# Patient Record
(demographics unavailable — no encounter records)

---

## 2025-02-25 NOTE — ASSESSMENT
[FreeTextEntry1] : Labs: None  CXR: pending  PFTs: pending  A/P: 69 yo F h/o HLD is evaluated for chronic cough.   Ms. Wallace's cough is likely multifactorial. Given her specific triggers and subjective wheezing/chest tightness, suspect some form of cough variant asthma. There may also be a component of GERD contributing as well - interestingly, she has had benefit with galen which is a prokinetic and may help explain this. UACS is unlikely contributing given her lack of upper respiratory symptoms (congestion, PND) otherwise.   Discussed trial of PPI as well as PRN albuterol, with plans to do full PFTs with Ni[facility] at next visit. We will also order a CXR to rule out any gross parenchymal abnormalities. If her cough persists despite these interventions, discussed CT chest for further evaluation.   1. Chronic Cough  2. R/O Asthma  3. GERD   -trial Albuterol PRN and Omeprazole 20 mg PO daily -CXR -PFTs with bronchodilator and FENO, 6MWT -follow-up with me in 2-3 weeks

## 2025-02-25 NOTE — ASSESSMENT
[FreeTextEntry1] : Labs: None  CXR: pending  PFTs: pending  A/P: 67 yo F h/o HLD is evaluated for chronic cough.   Ms. Wallace's cough is likely multifactorial. Given her specific triggers and subjective wheezing/chest tightness, suspect some form of cough variant asthma. There may also be a component of GERD contributing as well - interestingly, she has had benefit with galen which is a prokinetic and may help explain this. UACS is unlikely contributing given her lack of upper respiratory symptoms (congestion, PND) otherwise.   Discussed trial of PPI as well as PRN albuterol, with plans to do full PFTs with NiOX at next visit. We will also order a CXR to rule out any gross parenchymal abnormalities. If her cough persists despite these interventions, discussed CT chest for further evaluation.   1. Chronic Cough  2. R/O Asthma  3. GERD   -trial Albuterol PRN and Omeprazole 20 mg PO daily -CXR -PFTs with bronchodilator and FENO, 6MWT -follow-up with me in 2-3 weeks

## 2025-02-25 NOTE — REVIEW OF SYSTEMS
[Cough] : cough [Chest Tightness] : chest tightness [Seasonal Allergies] : seasonal allergies [GERD] : gerd [Negative] : Psychiatric [Fever] : no fever [Fatigue] : no fatigue [Chills] : no chills [Poor Appetite] : no poor appetite [Nasal Congestion] : no nasal congestion [Postnasal Drip] : no postnasal drip [Sinus Problems] : no sinus problems [Frequent URIs] : no frequent URIs [Sputum] : no sputum [Dyspnea] : no dyspnea [Wheezing] : no wheezing [A.M. Dry Mouth] : no a.m. dry mouth [SOB on Exertion] : no sob on exertion [Edema] : no edema [Syncope] : no syncope [Nasal Discharge] : no nasal discharge [Rash] : no rash [Itch] : no itch [Headache] : no headache [Dizziness] : no dizziness

## 2025-02-25 NOTE — ADDENDUM
[FreeTextEntry1] : Addendum (Anand; 2/25/25): CXR (2/25/25): IMPRESSION: Lungs clear. No infiltrate pleural effusion or pneumothorax. Unremarkable cardiac silhouette. No acute bone abnormality.

## 2025-02-25 NOTE — HISTORY OF PRESENT ILLNESS
[Never] : never [TextBox_4] : I saw Ms Mireya Wallace in consultation today for chronic cough. In review, Ms. Wallace is a 69 yo F h/o HLD. Ms. Wallace is cared for by Dr. Juana Castellanos.  Ms. Wallace's cough is chronic; she estimates that the cough started in . No change in symptoms, has been constantly bothersome since that time. Associated triggers include cold air, smoke, and laughter. She is a never smoker - her father smoked heavily growing up and has second hand exposure from him.  No personal or family history of any lung disease (asthma, COPD). Father  from lung cancer, mother had a lung tumor but  from COVID. Her lung abnormality was not worked up. Cough is associated rarely with clear phlegm. No associated fevers, chills, weight loss or B type symptoms.   Her only medication is a statin - not on ACE-is or ARBs. No pets at home. No allergies she is aware of other than seasonal. No history of recurrent infections or URIs. Does not have upper respiratory symptoms generally. Only alleviating factor is galen. There are no prandial triggers for her cough and it does not disturb her overnight. Has been told by family members that they think she is wheezing from time to time - she has not noticed this.   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  Ms. Wallace is a never smoker. She denies vaping.

## 2025-03-25 NOTE — ASSESSMENT
[FreeTextEntry1] : Labs:  None  CXR (2/25/25): IMPRESSION: Lungs clear. No infiltrate pleural effusion or pneumothorax. Unremarkable cardiac silhouette. No acute bone abnormality.  PFTs:           3/2025 FEV1/FVC   74% FEV1           1.99, 96% FVC             2.68, 102% TLC             3.73, 81% RV               1.33, 76% DLCO           102% -3/2025: FENO 24 ppb  6MWT 3/2025: 494 meters (1621 feet); lupe SpO2 95% on room air  A/P: 67 yo F h/o HLD is evaluated for chronic cough.   Ms. Wallace's improvement with PPI supports GERD as the etiology of her cough. Her PFTs are normal. We discussed GERD and treatment strategies, including PPI x3 months and conservative management: avoiding precipitating foods, eating >3 hours before sleep, elevating her head of bed 30 degrees. If she has recurrence at 3 months, we discussed transitioning to an H2 blocker (e.g., Famotidine).  1. Chronic cough 2. GERD  -continue PPI x3 months -discussed lifestyle measures -follow-up with our clinic PRN

## 2025-03-25 NOTE — PHYSICAL EXAM
[No Acute Distress] : no acute distress [Normal Appearance] : normal appearance [No Neck Mass] : no neck mass [Normal Rate/Rhythm] : normal rate/rhythm [Normal S1, S2] : normal s1, s2 [No Murmurs] : no murmurs [No Resp Distress] : no resp distress [Clear to Auscultation Bilaterally] : clear to auscultation bilaterally [No Abnormalities] : no abnormalities [Benign] : benign [Normal Gait] : normal gait [No Clubbing] : no clubbing [No Cyanosis] : no cyanosis [No Edema] : no edema [FROM] : FROM [Normal Color/ Pigmentation] : normal color/ pigmentation [No Focal Deficits] : no focal deficits [Oriented x3] : oriented x3 [Normal Affect] : normal affect [TextBox_11] : NC/AT

## 2025-03-25 NOTE — HISTORY OF PRESENT ILLNESS
[Never] : never [TextBox_4] : Ms. Maryana Wallace returned to clinic today for chronic cough. In review, Ms. Wallace is a 69 yo F h/o HLD. Ms. Wallace is cared for by Dr. Juana Castellanos.  25: Ms. Wallace's cough is chronic; she estimates that the cough started in . No change in symptoms, has been constantly bothersome since that time. Associated triggers include cold air, smoke, and laughter. She is a never smoker - her father smoked heavily growing up and has second hand exposure from him.  No personal or family history of any lung disease (asthma, COPD). Father  from lung cancer, mother had a lung tumor but  from COVID. Her lung abnormality was not worked up. Cough is associated rarely with clear phlegm. No associated fevers, chills, weight loss or B type symptoms.   Her only medication is a statin - not on ACE-is or ARBs. No pets at home. No allergies she is aware of other than seasonal. No history of recurrent infections or URIs. Does not have upper respiratory symptoms generally. Only alleviating factor is galen. There are no prandial triggers for her cough and it does not disturb her overnight. Has been told by family members that they think she is wheezing from time to time - she has not noticed this.   3/25/25: Ms. Wallace feels well today. She started the Omeprazole with significant improvement in her cough. She obtained the Albuterol, though she never used it. Her night-time cough has essentially resolved; she has less frequent epigastric burning and night-time awakenings.  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  Ms. Wallace is a never smoker. She denies vaping.